# Patient Record
Sex: FEMALE | Race: WHITE
[De-identification: names, ages, dates, MRNs, and addresses within clinical notes are randomized per-mention and may not be internally consistent; named-entity substitution may affect disease eponyms.]

---

## 2019-06-09 ENCOUNTER — HOSPITAL ENCOUNTER (EMERGENCY)
Dept: HOSPITAL 25 - UCCORT | Age: 44
Discharge: HOME | End: 2019-06-09
Payer: COMMERCIAL

## 2019-06-09 VITALS — SYSTOLIC BLOOD PRESSURE: 146 MMHG | DIASTOLIC BLOOD PRESSURE: 93 MMHG

## 2019-06-09 DIAGNOSIS — Z79.82: ICD-10-CM

## 2019-06-09 DIAGNOSIS — Z79.02: ICD-10-CM

## 2019-06-09 DIAGNOSIS — Z88.2: ICD-10-CM

## 2019-06-09 DIAGNOSIS — E07.9: ICD-10-CM

## 2019-06-09 DIAGNOSIS — I10: ICD-10-CM

## 2019-06-09 DIAGNOSIS — L25.9: Primary | ICD-10-CM

## 2019-06-09 DIAGNOSIS — Z88.1: ICD-10-CM

## 2019-06-09 DIAGNOSIS — Z95.5: ICD-10-CM

## 2019-06-09 DIAGNOSIS — E11.9: ICD-10-CM

## 2019-06-09 DIAGNOSIS — Z79.84: ICD-10-CM

## 2019-06-09 PROCEDURE — G0463 HOSPITAL OUTPT CLINIC VISIT: HCPCS

## 2019-06-09 PROCEDURE — 99212 OFFICE O/P EST SF 10 MIN: CPT

## 2019-06-09 NOTE — UC
Skin Complaint HPI





- HPI Summary


HPI Summary: 


43-year-old female who was on vacation in [place] and on June 4 noticed that 

she had a slight rash which is very itchy.  She does not believe they are 

bedbug bites however it has worsened mildly.  It started on her left arm but 

has now spread to her arms and legs.  She denies any difficulty breathing.





- History of Current Complaint


Chief Complaint: UCSkin


Time Seen by Provider: 06/09/19 14:02


Stated Complaint: RASH


Hx Obtained From: Patient


Hx Last Menstrual Period: first wk of May, 2019


Pregnant?: No


Onset/Duration: Gradual Onset, Lasting Days


Timing: Constant


Onset Severity: Mild


Current Severity: Mild


Pain Intensity: 1


Location: Other - Arms and legs.


Character: Pruritus, Redness, Raised


Aggravating Factor(s): Touch


Alleviating Factor(s): Nothing, Other - Patient has been applying Benadryl 

ointment to the areas.


Associated Signs & Symptoms: Positive: Negative


Related History: Insect Bite/Sting - Possibility of insect bite while on 

vacation.





- Allergy/Home Medications


Allergies/Adverse Reactions: 


 Allergies











Allergy/AdvReac Type Severity Reaction Status Date / Time


 


doxycycline Allergy  Rash Verified 06/09/19 14:09


 


erythromycin base Allergy  Rash Verified 06/09/19 14:09


 


Sulfa (Sulfonamide Allergy  Rash Verified 06/09/19 14:09





Antibiotics)     


 


cephalexin [From Keflex] AdvReac  Vomiting Verified 06/09/19 14:09











Home Medications: 


 Home Medications





Aspirin EC TAB* [Ecotrin EC Low Dose 81 MG*] 81 mg PO DAILY 06/09/19 [History 

Confirmed 06/09/19]


Cetirizine* [ZyrTEC 10 MG TAB*] 10 mg PO DAILY 06/09/19 [History Confirmed 06/09 /19]


Clopidogrel TAB* [Plavix TAB*] 75 mg PO DAILY 06/09/19 [History Confirmed 06/09/ 19]


Lansoprazole CAP (NF) [Prevacid CAP (NF)] 30 mg PO BID 06/09/19 [History 

Confirmed 06/09/19]


Levothyroxine TAB* [Synthroid TAB*] 175 mcg PO DAILY 06/09/19 [History 

Confirmed 06/09/19]


Lisinopril TAB* [Prinivil TAB*] 20 mg PO DAILY 06/09/19 [History Confirmed 06/09 /19]


Metoprolol Tartrate TAB* [Lopressor TAB*] 50 mg PO BID 06/09/19 [History 

Confirmed 06/09/19]


Mometasone NASAL (NF) [Nasonex (NF)] 1 spray BID 06/09/19 [History Confirmed 06/ 09/19]


Nitroglycerin 1 tab ONCE PRN 06/09/19 [History Confirmed 06/09/19]


Rosuvastatin Calcium 20 mg PO DAILY 06/09/19 [History Confirmed 06/09/19]


metFORMIN* [Glucophage 1000 MG TAB *] 1,000 mg PO BID 06/09/19 [History 

Confirmed 06/09/19]











PMH/Surg Hx/FS Hx/Imm Hx


Previously Healthy: Yes


Endocrine History: Diabetes, Thyroid Disease


Cardiovascular History: Hypertension





- Surgical History


Surgical History: Yes


Surgery Procedure, Year, and Place: Cardiac stent





- Family History


Known Family History: Positive: Non-Contributory





- Social History


Alcohol Use: Occasionally


Substance Use Type: None


Smoking Status (MU): Never Smoked Tobacco





Review of Systems


All Other Systems Reviewed And Are Negative: Yes


Skin: Positive: Rash - Rash started on her left arm but is no spread to her 

right arm and both legs.


Is Patient Immunocompromised?: No





Physical Exam


Triage Information Reviewed: Yes


Appearance: Well-Appearing, No Pain Distress, Well-Nourished


Vital Signs: 


 Initial Vital Signs











Temp  99.4 F   06/09/19 14:09


 


Pulse  98   06/09/19 14:09


 


Resp  20   06/09/19 14:09


 


BP  146/93   06/09/19 14:09


 


Pulse Ox  100   06/09/19 14:09











Vital Signs Reviewed: Yes


Eyes: Positive: Conjunctiva Clear


Respiratory: Positive: Lungs clear, Normal breath sounds, No respiratory 

distress, No accessory muscle use


Cardiovascular: Positive: RRR, No Murmur, Pulses Normal, Brisk Capillary Refill


Musculoskeletal Exam: Normal


Neurological Exam: Normal


Psychological Exam: Normal


Skin: Positive: Rashes - Patient has what appears to be a contact dermatitis in 

both arms and legs.  I don't feel these are insect bites.





Course/Dx





- Course


Course Of Treatment: 





I believe this is a contact dermatitis and not insect bites.  She preferred not 

to have a higher dose of prednisone because her blood sugar is on the 

borderline high.  She does not take insulin.  I'm going to try a Medrol Dosepak 

but she is to follow-up with her primary care provider if no improvement.





- Diagnoses


Provider Diagnosis: 


 Contact dermatitis








Discharge





- Sign-Out/Discharge


Documenting (check all that apply): Patient Departure


All imaging exams completed and their final reports reviewed: No Studies





- Discharge Plan


Condition: Fair


Disposition: HOME


Prescriptions: 


methylPREDNISolone [Medrol] 4 mg PO DAILY #1 tab.ds.pk


Patient Education Materials:  Contact Dermatitis (DC)


Referrals: 


Sung Mendieta DO [Primary Care Provider] - 


Additional Instructions: 


May continue taking Bivmadtd40-96 mg every 6 hours and at bedtime, may continue 

Zyrtec, may continue Benadryl cream to rash. Follow up with your primary care 

provider if no improvement in 2-3 days. Go to the ER if you have any facial 

swelling or difficulty breathing.





- Billing Disposition and Condition


Condition: FAIR


Disposition: Home





- Attestation Statements


Provider Attestation: 





Per institutional requirements, I have reviewed the chart, however, I was not 

consulted specifically or made aware of this patient by the  midlevel provider.

  I did not personally evaluate, interact with , or disposition  this patient.

## 2019-06-09 NOTE — XMS REPORT
Continuity of Care Document (CCD)

 Created on:May 22, 2019



Patient:Cindy Rae

Sex:Female

:1975

External Reference #:[id number]





Demographics







 Address  [address] [place] [address]

 

 Home Phone  [phone]

 

 Mobile Phone  [phone]

 

 Email Address  [email or web address]

 

 Preferred Language  en

 

 Marital Status  Not  or 

 

 Episcopal Affiliation  Unknown

 

 Race  White

 

 Ethnic Group  Not  or 









Author







 Name  Sophia Greene PA

 

 Address  [address].



   Unavailable



   [place] [address]









Support







 Name  Relationship  Address  Phone

 

 Sung Rae    Unavailable  [phone]

 

 Jennifer Dewey  Mother  Unavailable  [phone]









Care Team Providers







 Name  Role  Phone

 

 Sung Mendieta DO  Care Team Information   Unavailable

 

 Sung Mendieta DO  Primary Care Physician  Unavailable









Payers







 Date  Identification Numbers  Payment Provider  Subscriber

 

   Policy Number: [id number]  [employer]  Sung Rae









 PayID: [id number]   [address] [id number]









 [place] [address]







Problems







 Active Problems  Provider  Date

 

 Benign essential hypertension  Carroll Khanna M.D.  Onset: 03/15/2016

 

 Mixed hyperlipidemia  Carroll Khanna M.D.  Onset: 03/15/2016

 

 Calcific tendinitis of right shoulder  Sophia Greene PA  Onset: 2019

 

 Bicipital tenosynovitis  Carroll Khanna M.D.  Onset: 03/15/2016

 

 Disorder of shoulder  Carroll Khanna M.D.  Onset: 03/15/2016

 

 Shoulder joint pain  Carroll Khanna M.D.  Onset: 03/15/2016







Social History







 Type  Date  Description  Comments

 

 Birth Sex    Unknown  

 

 Marital Status      

 

 Home Environment    Lives With  Spouse

 

 Work Status    Unemployed  

 

 Tobacco Use  Start: Unknown  Never Smoked Cigarettes  

 

 ETOH Use    Currently consumes alcohol socially  

 

 Tobacco Use  Start: Unknown  Patient has never smoked  

 

 Recreational Drug Use    Never Used Drugs  

 

 Smoking Status  Reviewed: 19  Patient has never smoked  







Allergies, Adverse Reactions, Alerts







 Active Allergies  Reaction  Severity  Comments  Date

 

 Doxycycline        03/15/2016

 

 Erythromycin        03/15/2016

 

 Keflex        03/15/2016

 

 Sulfa Drugs        03/15/2016

 

 Shellfish-derived Products        10/23/2018







Medications







 Active Medications  SIG  Qnty  Indications  Ordering  Date



         Provider  

 

 One Daily  Daily Vitamin      Unknown  

 

 Lisinopril  1 by mouth every  90tabs    Unknown  



         20mg Tablets  day        



           

 

 Zyrtec Allergy  one daily      Unknown  

 

 Metformin HCL  2 Times Per Day      Unknown  



            1000mg          



           

 

 Fluticasone Propionate  as Needed      Unknown  



           



 150mg          



           

 

 Clopidogrel Bisulfate  1 by mouth every      Unknown  



   day        



 75mg Tablets          



           

 

 Lansoprazole  1 by mouth every      Unknown  



           30mg  day        



 Capsules DR          



           

 

 Nitroglycerin  1 patch to chest      Unknown  



            0.4mg/HR  wall 12 hours on        



 Patches 24HR  and then 12 hours        



   off        

 

 Metronidazole  apply to affected      Unknown  



            0.75% Cream  areas 2x/day        



   derm.        

 

 Levothyroxine Sodium  Take One Tablet      Unknown  



   By Mouth Every        



 175mcg Tablets  Day        



           

 

 Aspirin  Chew One Tablet      Unknown  



      81mg Chewtabs  By Mouth Every        



   Day        

 

 Metoprolol Tartrate  Take One Tablet      Unknown  



                  50mg  By Mouth Twice A        



 Tablets  Day        



           

 

 Proctosol HC  apply think film      Unknown  



           2.5% Cream  to rectum twice a        



   day and after        



   wiping from bowel        



   movements x 1        



   week then as        



   needed        

 

 Rosuvastatin Calcium  1 by mouth every      Unknown  



                   20mg  day        



 Tablets          



           









 History Medications









 Meloxicam  1 by mouth every  60tabs    Carroll Khanna,  2016 -



       15mg Tablets  day      M.D.  2018



           

 

 Ibuprofen        Unknown   -



           2016

 

 Fish Oil Extra Strength        Unknown   -



           2018

 

 Calcium 500/Vitamin D  Take 1 Daily      Unknown   -



           2018

 

 Atenolol        Unknown   -



           10/23/2018

 

 Levothyroxine Sodium  1 Time Per Day      Unknown   -



           2018

 

 Glyburide        Unknown   -



           10/23/2018

 

 Prevacid  as Needed      Unknown   -



           2018

 

 Atorvastatin Calcium  1 Time Per Day      Unknown   -



                  40mg          2019



           

 

 Tylenol        Unknown   -



           2016

 

 Voltaren        Unknown   -



           10/23/2018

 

 Aspirin Ec  1 by mouth every      Unknown   -



        81mg Tablets DR  day        2018



           

 

 Metoprolol Succinate ER  1 by mouth every      Unknown   -



   day        2018



 50mg Tablets ER 24HR          



           

 

 Atorvastatin Calcium  Take One Tablet      Unknown   -



                  40mg  By Mouth Every        2018



 Tablets  Day        



           







Medications Administered in Office







 Medication  SIG  Qnty  Indications  Ordering Provider  Date

 

 Depomedrol 40mg/1cc        Sophia Greene PA  2019



 (methylprednisolone acetate)          



                Injection          



           

 

 Depomedrol 40mg/1cc        Sophia Greene PA  2019



 (methylprednisolone acetate)          



                Injection          



           







Vital Signs







 Date  Vital  Result  Comment

 

 2019  7:53am  BP Systolic  131 mmHg  









 BP Diastolic  85 mmHg  

 

 Body Temperature  98.1 F  

 

 Heart Rate  76 /min  

 

 Height  61.5 inches  5'1.50"

 

 Weight  240.00 lb  

 

 BMI (Body Mass Index)  44.6 kg/m2  

 

 BSA (Body Surface Area)  2.05 m2  

 

 Ideal body weight in kilograms  49 kg  

 

 O2 % BldC Oximetry  100 %  









 2018  8:45am  BP Systolic  123 mmHg  









 BP Diastolic  84 mmHg  

 

 Body Temperature  98.5 F  

 

 Heart Rate  82 /min  

 

 Respiratory Rate  18 /min  

 

 Height  61 inches  5'1"

 

 Weight  234.00 lb  

 

 BMI (Body Mass Index)  44.2 kg/m2  

 

 BSA (Body Surface Area)  2.02 m2  

 

 Ideal body weight in kilograms  48 kg  

 

 O2 % BldC Oximetry  100 %  









 10/23/2018  8:46am  BP Systolic  129 mmHg  









 BP Diastolic  86 mmHg  

 

 Heart Rate  99 /min  

 

 Height  62 inches  5'2"

 

 Weight  237.00 lb  

 

 BMI (Body Mass Index)  43.3 kg/m2  

 

 BSA (Body Surface Area)  2.05 m2  

 

 Ideal body weight in kilograms  50 kg  

 

 O2 % BldC Oximetry  99 %  









 03/15/2016  8:50am  BP Systolic  161 mmHg  









 BP Diastolic  99 mmHg  

 

 Heart Rate  87 /min  

 

 Height  61 inches  5'1"

 

 Weight  272.00 lb  

 

 BMI (Body Mass Index)  51.4 kg/m2  

 

 BSA (Body Surface Area)  2.15 m2  







Procedures







 Date  Code  Description  Status

 

 2019  Asp./Injection major joint  Completed

 

 201950  Injection:Tendon Sheath,Lig. Cyst  Completed

 

 2019  61713  Debridement Nails Any Method 6 Or More  Completed

 

 2019  24930  Pare Hyperkeratotic Lesion, 2-4  Completed

 

 2018  07422  Debridement Nails Any Method 6 Or More  Completed

 

 2018  62102  Pare Hyperkeratotic Lesion, 2-4  Completed

 

 10/23/2018  76914  Debridement Nails Any Method 6 Or More  Completed

 

 10/23/2018  30218  Pare Hyperkeratotic Lesion, 2-4  Completed

 

 03/15/2016  31954  Radiology, Shoulder: Two Views (Sso)  Completed

 

 03/15/2016  62927  Asp./Injection major joint  Completed

 

 03/15/2016  99428  Injection, Tendon Origin/Insertion  Completed

 

 1998  89237  Biopsy Skin Lesion  Completed







Encounters







 Type  Date  Location  Provider  Dx  Diagnosis

 

 Office Visit  2019  [facility]  Spohia Greene M75.31  Calcific



   2:30p    PA    tendinitis of



           right shoulder









 M75.21  Bicipital tendinitis, right shoulder









 Office Visit  2016  8:30a  [facility]  Carroll Khanna,  M25.511  
Pain in right



       M.D.    shoulder









 M75.41  Impingement syndrome of right shoulder









 Office Visit  03/15/2016  8:45a  [facility]  Carroll Khanna  M25.511  
Pain in right



       M.D.    shoulder









 M75.41  Impingement syndrome of right shoulder

 

 M75.21  Bicipital tendinitis, right shoulder







Plan of Treatment

Future Appointment(s):2019  8:30 am - Sophia Greene PA at [facility] 
Office

## 2019-06-26 ENCOUNTER — HOSPITAL ENCOUNTER (EMERGENCY)
Dept: HOSPITAL 25 - UCCORT | Age: 44
Discharge: HOME | End: 2019-06-26
Payer: COMMERCIAL

## 2019-06-26 VITALS — SYSTOLIC BLOOD PRESSURE: 153 MMHG | DIASTOLIC BLOOD PRESSURE: 86 MMHG

## 2019-06-26 DIAGNOSIS — H92.02: Primary | ICD-10-CM

## 2019-06-26 DIAGNOSIS — Z88.1: ICD-10-CM

## 2019-06-26 DIAGNOSIS — I25.2: ICD-10-CM

## 2019-06-26 DIAGNOSIS — E11.9: ICD-10-CM

## 2019-06-26 DIAGNOSIS — I10: ICD-10-CM

## 2019-06-26 DIAGNOSIS — Z88.2: ICD-10-CM

## 2019-06-26 PROCEDURE — 99212 OFFICE O/P EST SF 10 MIN: CPT

## 2019-06-26 PROCEDURE — G0463 HOSPITAL OUTPT CLINIC VISIT: HCPCS

## 2019-06-26 NOTE — XMS REPORT
Continuity of Care Document (CCD)

 Created on:2019



Patient:Cindy Rae

Sex:Female

:1975

External Reference #:[id number]





Demographics







 Address  [address] [place] [address]

 

 Home Phone  [phone]

 

 Mobile Phone  [phone]

 

 Email Address  [email or web address]

 

 Preferred Language  en

 

 Marital Status  Not  or 

 

 Restorationism Affiliation  Unknown

 

 Race  White

 

 Ethnic Group  Not  or 









Author







 Name  Sophia Greene PA

 

 Address  [address].



   Unavailable



   [place] [address]









Support







 Name  Relationship  Address  Phone

 

 Sung Rae    Unavailable  [phone]

 

 ColJennifer alaniz  Mother  Unavailable  [phone]









Care Team Providers







 Name  Role  Phone

 

 Sung Mendieta DO  Care Team Information   Unavailable

 

 Sung Mendieta DO  Primary Care Physician  Unavailable









Payers







 Date  Identification Numbers  Payment Provider  Subscriber

 

   Policy Number: [id number]  [employer]  Sung Rae









 PayID: EBSRM   [address] [id number]









 [place] [address]







Problems







 Active Problems  Provider  Date

 

 Benign essential hypertension  Carroll Khanna M.D.  Onset: 03/15/2016

 

 Mixed hyperlipidemia  Carorll Khanna M.D.  Onset: 03/15/2016

 

 Eruption  Sophia Greene PA  Onset: 2019

 

 Calcific tendinitis of right shoulder  Sophia Greene PA  Onset: 2019

 

 Bicipital tenosynovitis  Carroll Khanna M.D.  Onset: 03/15/2016

 

 Disorder of shoulder  Carroll Khanna M.D.  Onset: 03/15/2016

 

 Shoulder joint pain  Carroll Khanna M.D.  Onset: 03/15/2016







Social History







 Type  Date  Description  Comments

 

 Birth Sex    Unknown  

 

 Marital Status      

 

 Home Environment    Lives With  Spouse

 

 Work Status    Unemployed  

 

 Hand Dominance    Right-handed  

 

 Tobacco Use  Start: Unknown  Never Smoked Cigarettes  

 

 ETOH Use    Currently consumes alcohol socially  

 

 Tobacco Use  Start: Unknown  Patient has never smoked  

 

 Recreational Drug Use    Never Used Drugs  

 

 Smoking Status  Reviewed: 19  Patient has never smoked  







Allergies, Adverse Reactions, Alerts







 Active Allergies  Reaction  Severity  Comments  Date

 

 Doxycycline        03/15/2016

 

 Erythromycin        03/15/2016

 

 Keflex        03/15/2016

 

 Sulfa Drugs        03/15/2016

 

 Shellfish-derived Products        10/23/2018







Medications







 Active Medications  SIG  Qnty  Indications  Ordering  Date



         Provider  

 

 Rosuvastatin Calcium  1 by mouth every      Unknown  



                   20mg  day        



 Tablets          



           

 

 Proctosol HC  apply think film      Unknown  



           2.5% Cream  to rectum twice a        



   day and after        



   wiping from bowel        



   movements x 1        



   week then as        



   needed        

 

 Metoprolol Tartrate  Take One Tablet      Unknown  



                  50mg  By Mouth Twice A        



 Tablets  Day        



           

 

 Aspirin  Chew One Tablet      Unknown  



      81mg Chewtabs  By Mouth Every        



   Day        

 

 Levothyroxine Sodium  Take One Tablet      Unknown  



   By Mouth Every        



 175mcg Tablets  Day        



           

 

 Metronidazole  apply to affected      Unknown  



            0.75% Cream  areas 2x/day        



   derm.        

 

 Nitroglycerin  1 patch to chest      Unknown  



            0.4mg/HR  wall 12 hours on        



 Patches 24HR  and then 12 hours        



   off        

 

 Lansoprazole  1 by mouth every      Unknown  



           30mg  day        



 Capsules DR          



           

 

 Fluticasone Propionate  as Needed      Unknown  



           



 150mg          



           

 

 Metformin HCL  2 Times Per Day      Unknown  



            1000mg          



           

 

 Zyrtec Allergy  2 daily      Unknown  

 

 Lisinopril  1 by mouth every  90tabs    Unknown  



         20mg Tablets  day        



           

 

 One Daily  Daily Vitamin      Unknown  









 History Medications









 Meloxicam  1 by mouth every  60tabs    Carroll Khanna,  2016 -



       15mg Tablets  day      M.D.  2018



           

 

 Atorvastatin Calcium  Take One Tablet      Unknown   -



                  40mg  By Mouth Every        2018



 Tablets  Day        



           

 

 Metoprolol Succinate ER  1 by mouth every      Unknown   -



   day        2018



 50mg Tablets ER 24HR          



           

 

 Aspirin Ec  1 by mouth every      Unknown   -



        81mg Tablets DR  day        2018



           

 

 Clopidogrel Bisulfate  1 by mouth every      Unknown   -



                   75mg  day        2019



 Tablets          



           

 

 Voltaren        Unknown   -



           10/23/2018

 

 Tylenol        Unknown   -



           2016

 

 Atorvastatin Calcium  1 Time Per Day      Unknown   -



                  40mg          2019



           

 

 Prevacid  as Needed      Unknown   -



           2018

 

 Glyburide        Unknown   -



           10/23/2018

 

 Levothyroxine Sodium  1 Time Per Day      Unknown   -



           2018

 

 Atenolol        Unknown   -



           10/23/2018

 

 Calcium 500/Vitamin D  Take 1 Daily      Unknown   -



           2018

 

 Fish Oil Extra Strength        Unknown   -



           2018

 

 Ibuprofen        Unknown   -



           2016







Vital Signs







 Date  Vital  Result  Comment

 

 2019  8:27am  BP Systolic  116 mmHg  









 BP Diastolic  77 mmHg  

 

 Body Temperature  98.1 F  

 

 Heart Rate  70 /min  

 

 Height  61.5 inches  5'1.50"

 

 Weight  234.00 lb  

 

 BMI (Body Mass Index)  43.5 kg/m2  

 

 BSA (Body Surface Area)  2.03 m2  

 

 Ideal body weight in kilograms  49 kg  

 

 O2 % BldC Oximetry  100 %  









 2019  7:55am  BP Systolic  132 mmHg  









 BP Diastolic  88 mmHg  

 

 Body Temperature  98.1 F  

 

 Heart Rate  75 /min  

 

 Height  62 inches  5'2"

 

 Weight  232.00 lb  

 

 BMI (Body Mass Index)  42.4 kg/m2  

 

 BSA (Body Surface Area)  2.04 m2  

 

 Ideal body weight in kilograms  50 kg  

 

 O2 % BldC Oximetry  99 %  









 2019  7:53am  BP Systolic  131 mmHg  









 BP Diastolic  85 mmHg  

 

 Body Temperature  98.1 F  

 

 Heart Rate  76 /min  

 

 Height  61.5 inches  5'1.50"

 

 Weight  240.00 lb  

 

 BMI (Body Mass Index)  44.6 kg/m2  

 

 BSA (Body Surface Area)  2.05 m2  

 

 Ideal body weight in kilograms  49 kg  

 

 O2 % BldC Oximetry  100 %  









 2018  8:45am  BP Systolic  123 mmHg  









 BP Diastolic  84 mmHg  

 

 Body Temperature  98.5 F  

 

 Heart Rate  82 /min  

 

 Respiratory Rate  18 /min  

 

 Height  61 inches  5'1"

 

 Weight  234.00 lb  

 

 BMI (Body Mass Index)  44.2 kg/m2  

 

 BSA (Body Surface Area)  2.02 m2  

 

 Ideal body weight in kilograms  48 kg  

 

 O2 % BldC Oximetry  100 %  









 10/23/2018  8:46am  BP Systolic  129 mmHg  









 BP Diastolic  86 mmHg  

 

 Heart Rate  99 /min  

 

 Height  62 inches  5'2"

 

 Weight  237.00 lb  

 

 BMI (Body Mass Index)  43.3 kg/m2  

 

 BSA (Body Surface Area)  2.05 m2  

 

 Ideal body weight in kilograms  50 kg  

 

 O2 % BldC Oximetry  99 %  









 03/15/2016  8:50am  BP Systolic  161 mmHg  









 BP Diastolic  99 mmHg  

 

 Heart Rate  87 /min  

 

 Height  61 inches  5'1"

 

 Weight  272.00 lb  

 

 BMI (Body Mass Index)  51.4 kg/m2  

 

 BSA (Body Surface Area)  2.15 m2  







Procedures







 Date  Code  Description  Status

 

 2019  12768  Debridement Nails Any Method 6 Or More  Completed

 

 2019  81066  Pare Hyperkeratotic Lesion, 2-4  Completed

 

 2019  Asp./Injection major joint  Completed

 

 2019  Injection:Tendon Sheath,Lig. Cyst  Completed

 

 2019  89238  Debridement Nails Any Method 6 Or More  Completed

 

 2019  62200  Pare Hyperkeratotic Lesion, 2-4  Completed

 

 2018  23239  Debridement Nails Any Method 6 Or More  Completed

 

 2018  69137  Pare Hyperkeratotic Lesion, 2-4  Completed

 

 10/23/2018  67975  Debridement Nails Any Method 6 Or More  Completed

 

 10/23/2018  21504  Pare Hyperkeratotic Lesion, 2-4  Completed

 

 03/15/2016  61365  Radiology, Shoulder: Two Views (Sso)  Completed

 

 03/15/2016  66445  Asp./Injection major joint  Completed

 

 03/15/2016  36346  Injection, Tendon Origin/Insertion  Completed

 

 1998  78451  Biopsy Skin Lesion  Completed







Encounters







 Type  Date  Location  Provider  Dx  Diagnosis

 

 Office Visit  2019  [facility]  Sophia Greene  M75.31  [facility]



   8:30a    PA    tendinitis of



           right shoulder









 M75.21  Bicipital tendinitis, right shoulder

 

 M75.41  Impingement syndrome of right shoulder

 

 R21  Rash and other nonspecific skin eruption









 Office Visit  2019  2:30p  [facility]  Sophia Greene  M25.511  
Pain in right



       PA    shoulder









 M75.31  Calcific tendinitis of right shoulder

 

 M75.21  Bicipital tendinitis, right shoulder









 Office Visit  2016  8:30a  [facility]  Carroll Khanna M25.511  
Pain in right



       M.D.    shoulder









 M75.41  Impingement syndrome of right shoulder









 Office Visit  03/15/2016  8:45a  [facility]  Carroll Khanna M25.511  
Pain in right



       M.D.    shoulder









 M75.41  Impingement syndrome of right shoulder

 

 M75.21  Bicipital tendinitis, right shoulder







Plan of Treatment

Future Appointment(s):2019  8:40 am - Tavares Eid DPM at Podiatry 
[id number] - Sophia Greene, PAM75.31 [facility] tendinitis of right 
shoulderComments:Overall she is doing better.  I have explained that she may 
need repeat injection in the future.  She can proceed with activities as 
tolerated.  She will follow up at our office on an as-needed basis.As far as 
the rash I do not believe it's related to the injection but there is certainly 
a possibility.  I would continue with follow-up with dermatology.M75.21 
Bicipital tendinitis, right aozbfpidT15.41 Impingement syndrome of right 
bynoaaedD32 Rash and other nonspecific skin eruptionAllFollow up:prn

## 2019-06-26 NOTE — UC
Ear Complaint HPI





- HPI Summary


HPI Summary: 


43-year-old woman comes in with a chief complaint of left ear pain.  Patient 

was cleaning her ears this morning pain started shortly thereafter.  She feels 

like a stabbing pains a year.  Denies any dental pain and jaw pain parotid 

gland pain.  Has not been swimming.  No fevers or chills.





- History of Current Complaint


Chief Complaint: UCEar


Stated Complaint: EAR PAIN


Time Seen by Provider: 06/26/19 16:26


Hx Last Menstrual Period: 6/11/19


Pain Intensity: 5





- Allergies/Home Medications


Allergies/Adverse Reactions: 


 Allergies











Allergy/AdvReac Type Severity Reaction Status Date / Time


 


doxycycline Allergy  Rash Verified 06/26/19 16:36


 


erythromycin base Allergy  Rash Verified 06/26/19 16:36


 


Sulfa (Sulfonamide Allergy  Rash Verified 06/26/19 16:36





Antibiotics)     


 


cephalexin [From Keflex] AdvReac  Vomiting Verified 06/26/19 16:36











Home Medications: 


 Home Medications





Multivitamin [Multivitamins] 1 each PO DAILY 06/26/19 [History Confirmed 06/26/ 19]


Steroid Cream 1 applic TOPICAL DAILY 06/26/19 [History Confirmed 06/26/19]


diPHENhydraMINE PO* [Benadryl PO 25 MG TAB*] 50 mg PO BEDTIME PRN 06/26/19 [

History Confirmed 06/26/19]











PMH/Surg Hx/FS Hx/Imm Hx


Previously Healthy: Yes


Endocrine History: Diabetes, Hypothyroidism, Dyslipidemia


Cardiovascular History: Hypertension, Myocardial Infarction





- Surgical History


Surgical History: Yes


Surgery Procedure, Year, and Place: Cardiac stent 5/28/18 [facility]





- Family History


Known Family History: Positive: Non-Contributory





- Social History


Alcohol Use: Occasionally


Substance Use Type: None


Smoking Status (MU): Never Smoked Tobacco





Review of Systems


All Other Systems Reviewed And Are Negative: Yes


Constitutional: Positive: Negative, Fever


Eyes: Positive: Negative


ENT: Positive: Ear Ache


Respiratory: Positive: Negative


Cardiovascular: Positive: Negative


Gastrointestinal: Positive: Negative


Motor: Positive: Negative


Neurovascular: Positive: Negative


Musculoskeletal: Positive: Negative


Neurological: Positive: Negative


Psychological: Positive: Negative


Is Patient Immunocompromised?: No





Physical Exam


Triage Information Reviewed: Yes


Appearance: Well-Appearing, No Pain Distress, Well-Nourished


Vital Signs: 


 Initial Vital Signs











Temp  98.3 F   06/26/19 16:31


 


Pulse  90   06/26/19 16:31


 


Resp  18   06/26/19 16:31


 


BP  153/86   06/26/19 16:31


 


Pulse Ox  100   06/26/19 16:31











Vital Signs Reviewed: Yes


Eye Exam: Normal


Eyes: Positive: Conjunctiva Clear


ENT: Positive: Pharynx normal, TM bulging - left, Other - Mild tenderness to 

palpation of left tragus


Neck: Positive: Supple


Respiratory: Positive: No respiratory distress


Musculoskeletal: Positive: Strength Intact, ROM Intact


Neurological: Positive: Alert, Muscle Tone Normal


Psychological: Positive: Normal Response To Family, Age Appropriate Behavior


Skin Exam: Normal





Ear Complaint Course/Dx





- Differential Dx/Diagnosis


Provider Diagnosis: 


 Left ear pain








Discharge





- Sign-Out/Discharge


Documenting (check all that apply): Patient Departure


All imaging exams completed and their final reports reviewed: No Studies





- Discharge Plan


Condition: Stable


Disposition: HOME


Prescriptions: 


Amoxicillin PO (*) [Amoxicillin 875 MG (*)] 875 mg PO BID #20 tab


Ofloxacin 0.3% (Ear Drop)* [Floxin 0.3% OTIC.SOL (Ear Drop)] 5 drop LEFT EAR 

BID #1 btl


Patient Education Materials:  Otitis Externa (ED), Ear Infection (ED)


Referrals: 


Sung Mendieta DO [Primary Care Provider] - 


Additional Instructions: 


FOLLOW UP WITH YOUR DOCTOR IF NOT COMPLETELY IMPROVED.


GET RECHECKED SOONER IF YOUR CONDITION WORSENS OR ANY QUESTIONS OR CONCERNS.





- Billing Disposition and Condition


Condition: STABLE


Disposition: Home